# Patient Record
(demographics unavailable — no encounter records)

---

## 2025-04-10 NOTE — HISTORY OF PRESENT ILLNESS
[de-identified] : 12/29/2021  Surgery - The patient was taken to the OR today. 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 PROCEDURE PERFORMED: 1. Left knee arthroscopic medial meniscectomy (CPT Code: 69624.59). 2. Left knee arthroscopic synovectomy (CPT Code: 74653.59). 3. Left knee arthroscopic chondroplasty of the patellofemoral and medial compartments (CPT Code: 82443.59).  doing well Still having pain  injections  nad michelle le rom 0-120 pos pat gring nvi comp soft and nt no instability  plan went over findings explained tx options since pain has returned for michelle knees will proceed with injections cont pt, hep, and pain control fu i in 3-4 months  Large Joint Injection was performed because of pain/rom. Anesthesia: ethyl chloride sprayed topically. Dexamethasone 2 cc of 4mg.   Lidocaine: 2 cc of 1%  Medication was injected in bilateral knees. Patient has tried OTC's including aspirin, Ibuprofen, Aleve etc or prescription NSAIDS, and/or exercises at home and/ or physical therapy without satisfactory response. After verbal consent using sterile preparation and technique. The risks, benefits, and alternatives to cortisone injection were explained in full to the patient. Risks outlined include but are not limited to infection, sepsis, bleeding, scarring, skin discoloration, temporary increase in pain, syncopal episode, failure to resolve symptoms, allergic reaction, symptom recurrence, and elevation of blood sugar in diabetics. Patient understood the risks. All questions were answered. After discussion of options, patient requested an injection. Oral informed consent was obtained and sterile prep was done of the injection site. Sterile technique was utilized for the procedure including the preparation of the solutions used for the injection. Patient tolerated the procedure well. Advised to ice the injection site this evening. Prep with alcohol locally to site. Sterile technique used. Diagnostic ultrasound was performed of the bilateral knee to confirm.

## 2025-06-19 NOTE — HISTORY OF PRESENT ILLNESS
[de-identified] : 12/29/2021  Surgery - The patient was taken to the OR today. 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 PROCEDURE PERFORMED: 1. Left knee arthroscopic medial meniscectomy (CPT Code: 34228.59). 2. Left knee arthroscopic synovectomy (CPT Code: 29182.59). 3. Left knee arthroscopic chondroplasty of the patellofemoral and medial compartments (CPT Code: 97221.59).  pain is getting worse  nad michelle le rom 0-120 pos pat gring nvi comp soft and nt no instability  new michelle knee xray adv michelle knee oa  plan went over findings explained tx options explained oa pt will send auth for michelle knee zilretta

## 2025-07-23 NOTE — HISTORY OF PRESENT ILLNESS
[Follow-Up - Routine Clinic] : a routine clinic follow-up of [None] : No associated symptoms are reported [Good Compliance] : good compliance with treatment [Fair Compliance] : fair compliance with treatment [Fair Tolerance] : fair tolerance of treatment [Poor Tolerance] : poor tolerance of treatment [Fair Symptom Control] : fair symptom control [de-identified] : APAP

## 2025-07-23 NOTE — ASSESSMENT
[FreeTextEntry1] : HO Moderate REUBEN  not on therapy SP significant weight loss post bariatric surgery.  Now gained the weight back Considering Right TKR NO smoking history

## 2025-07-23 NOTE — PROCEDURE
[FreeTextEntry1] : CXR PA and Lateral  The costophrenic and cardiophrenic angles are sharp The jose parenchyma shows no infiltrates, consolidations, or nodules  The Mediastinum is within normal limits No pleural effusions